# Patient Record
Sex: FEMALE | URBAN - METROPOLITAN AREA
[De-identification: names, ages, dates, MRNs, and addresses within clinical notes are randomized per-mention and may not be internally consistent; named-entity substitution may affect disease eponyms.]

---

## 2021-04-30 ENCOUNTER — OFFICE VISIT (OUTPATIENT)
Dept: URBAN - METROPOLITAN AREA CLINIC 96 | Facility: CLINIC | Age: 46
End: 2021-04-30
Payer: COMMERCIAL

## 2021-04-30 DIAGNOSIS — Z12.11 SCREEN FOR COLON CANCER: ICD-10-CM

## 2021-04-30 DIAGNOSIS — K21.9 GASTROESOPHAGEAL REFLUX DISEASE, UNSPECIFIED WHETHER ESOPHAGITIS PRESENT: ICD-10-CM

## 2021-04-30 DIAGNOSIS — C90.00 MULTIPLE MYELOMA, REMISSION STATUS UNSPECIFIED: ICD-10-CM

## 2021-04-30 DIAGNOSIS — K59.00 CONSTIPATION, UNSPECIFIED CONSTIPATION TYPE: ICD-10-CM

## 2021-04-30 DIAGNOSIS — R93.89 ABNORMAL RADIOGRAPH: ICD-10-CM

## 2021-04-30 DIAGNOSIS — K80.20 GALLSTONES: ICD-10-CM

## 2021-04-30 PROCEDURE — 99203 OFFICE O/P NEW LOW 30 MIN: CPT | Performed by: INTERNAL MEDICINE

## 2021-04-30 RX ORDER — BACLOFEN 20 MG/1
1 TABLET ADMINISTER WITHOUT REGARDS TO MEALS AS NEEDED TABLET ORAL TWICE A DAY
Status: ACTIVE | COMMUNITY

## 2021-04-30 RX ORDER — PHENTERMINE HYDROCHLORIDE 37.5 MG/1
1 TABLET TABLET ORAL ONCE A DAY
Status: ACTIVE | COMMUNITY

## 2021-04-30 RX ORDER — FENTANYL 25 UG/H
1 PATCH TO SKIN PATCH TRANSDERMAL
Status: ACTIVE | COMMUNITY

## 2021-04-30 RX ORDER — NIFEDIPINE 60 MG/1
1 TABLET ON AN EMPTY STOMACH TABLET, EXTENDED RELEASE ORAL ONCE A DAY
Status: ACTIVE | COMMUNITY

## 2021-04-30 RX ORDER — OXYCODONE HYDROCHLORIDE 10 MG/1
1 TABLET AS NEEDED TABLET ORAL
Status: ACTIVE | COMMUNITY

## 2021-04-30 RX ORDER — CARVEDILOL 25 MG/1
1 TABLET WITH FOOD TABLET, FILM COATED ORAL TWICE A DAY
Status: ACTIVE | COMMUNITY

## 2021-04-30 RX ORDER — HYDROCHLOROTHIAZIDE 25 MG/1
1 TABLET IN THE MORNING TABLET ORAL ONCE A DAY
Status: ACTIVE | COMMUNITY

## 2021-04-30 RX ORDER — LUBIPROSTONE 24 UG/1
1 CAPSULE WITH FOOD AND WATER CAPSULE, GELATIN COATED ORAL TWICE A DAY
Status: ACTIVE | COMMUNITY

## 2021-04-30 NOTE — HPI-TODAY'S VISIT:
45 yo F formerly worked for [employer]. 2 kids.  s/p lap band w dr erwin 6/2009. weighed 220# at 5' now weights 118#. last saw him 11/2020 and all was good. had stem cell tx 1/11/2021 vomited w rx and couldnt swallow. then saw dr wm braga. no rx done in that pt was better. she has returned to her baseline re eating habits has some mild gerd primarily at HS which w rxs w elevating the HOB. CT 4/26/2021-distension of esoph w ? of obs at [facility]. has chronic constipation w a bm q 3-4 days and has to freq use enemas. has just started amitiza 24mcg bid rxed by her pain MD.

## 2021-05-05 ENCOUNTER — OFFICE VISIT (OUTPATIENT)
Dept: URBAN - METROPOLITAN AREA MEDICAL CENTER 28 | Facility: MEDICAL CENTER | Age: 46
End: 2021-05-05
Payer: COMMERCIAL

## 2021-05-05 DIAGNOSIS — R93.3 ABN FINDINGS-GI TRACT: ICD-10-CM

## 2021-05-05 DIAGNOSIS — K22.8 COLUMNAR-LINED ESOPHAGUS: ICD-10-CM

## 2021-05-05 PROCEDURE — 43235 EGD DIAGNOSTIC BRUSH WASH: CPT | Performed by: INTERNAL MEDICINE

## 2021-05-05 RX ORDER — LUBIPROSTONE 24 UG/1
1 CAPSULE WITH FOOD AND WATER CAPSULE, GELATIN COATED ORAL TWICE A DAY
Status: ACTIVE | COMMUNITY

## 2021-05-05 RX ORDER — FENTANYL 25 UG/H
1 PATCH TO SKIN PATCH TRANSDERMAL
Status: ACTIVE | COMMUNITY

## 2021-05-05 RX ORDER — BACLOFEN 20 MG/1
1 TABLET ADMINISTER WITHOUT REGARDS TO MEALS AS NEEDED TABLET ORAL TWICE A DAY
Status: ACTIVE | COMMUNITY

## 2021-05-05 RX ORDER — NIFEDIPINE 60 MG/1
1 TABLET ON AN EMPTY STOMACH TABLET, EXTENDED RELEASE ORAL ONCE A DAY
Status: ACTIVE | COMMUNITY

## 2021-05-05 RX ORDER — PHENTERMINE HYDROCHLORIDE 37.5 MG/1
1 TABLET TABLET ORAL ONCE A DAY
Status: ACTIVE | COMMUNITY

## 2021-05-05 RX ORDER — OXYCODONE HYDROCHLORIDE 10 MG/1
1 TABLET AS NEEDED TABLET ORAL
Status: ACTIVE | COMMUNITY

## 2021-05-05 RX ORDER — HYDROCHLOROTHIAZIDE 25 MG/1
1 TABLET IN THE MORNING TABLET ORAL ONCE A DAY
Status: ACTIVE | COMMUNITY

## 2021-05-05 RX ORDER — CARVEDILOL 25 MG/1
1 TABLET WITH FOOD TABLET, FILM COATED ORAL TWICE A DAY
Status: ACTIVE | COMMUNITY

## 2021-11-03 ENCOUNTER — OFFICE VISIT (OUTPATIENT)
Dept: URBAN - METROPOLITAN AREA CLINIC 96 | Facility: CLINIC | Age: 46
End: 2021-11-03
Payer: COMMERCIAL

## 2021-11-03 ENCOUNTER — DASHBOARD ENCOUNTERS (OUTPATIENT)
Age: 46
End: 2021-11-03

## 2021-11-03 DIAGNOSIS — R93.89 ABNORMAL RADIOGRAPH: ICD-10-CM

## 2021-11-03 DIAGNOSIS — K21.9 GASTROESOPHAGEAL REFLUX DISEASE, UNSPECIFIED WHETHER ESOPHAGITIS PRESENT: ICD-10-CM

## 2021-11-03 DIAGNOSIS — K80.20 GALLSTONES: ICD-10-CM

## 2021-11-03 DIAGNOSIS — Z98.84 S/P BARIATRIC SURGERY: ICD-10-CM

## 2021-11-03 DIAGNOSIS — C90.00 MULTIPLE MYELOMA, REMISSION STATUS UNSPECIFIED: ICD-10-CM

## 2021-11-03 DIAGNOSIS — K59.00 CONSTIPATION, UNSPECIFIED CONSTIPATION TYPE: ICD-10-CM

## 2021-11-03 DIAGNOSIS — Z12.11 SCREEN FOR COLON CANCER: ICD-10-CM

## 2021-11-03 PROBLEM — 305058001: Status: ACTIVE | Noted: 2021-04-30

## 2021-11-03 PROBLEM — 235595009: Status: ACTIVE | Noted: 2021-04-30

## 2021-11-03 PROBLEM — 14760008: Status: ACTIVE | Noted: 2021-04-30

## 2021-11-03 PROBLEM — 168501001: Status: ACTIVE | Noted: 2021-04-30

## 2021-11-03 PROBLEM — 109989006: Status: ACTIVE | Noted: 2021-04-30

## 2021-11-03 PROBLEM — 608848006: Status: ACTIVE | Noted: 2021-11-03

## 2021-11-03 PROBLEM — 235919008: Status: ACTIVE | Noted: 2021-04-30

## 2021-11-03 PROCEDURE — 99214 OFFICE O/P EST MOD 30 MIN: CPT | Performed by: INTERNAL MEDICINE

## 2021-11-03 RX ORDER — LUBIPROSTONE 24 UG/1
1 CAPSULE WITH FOOD AND WATER CAPSULE, GELATIN COATED ORAL TWICE A DAY
Status: ACTIVE | COMMUNITY

## 2021-11-03 RX ORDER — BACLOFEN 20 MG/1
1 TABLET ADMINISTER WITHOUT REGARDS TO MEALS AS NEEDED TABLET ORAL TWICE A DAY
Status: ACTIVE | COMMUNITY

## 2021-11-03 RX ORDER — OXYCODONE HYDROCHLORIDE 10 MG/1
1 TABLET AS NEEDED TABLET ORAL
Status: ACTIVE | COMMUNITY

## 2021-11-03 RX ORDER — FENTANYL 25 UG/H
1 PATCH TO SKIN PATCH TRANSDERMAL
Status: ACTIVE | COMMUNITY

## 2021-11-03 NOTE — HPI-TODAY'S VISIT:
45 yo F formerly worked for [employer]. 2 kids.  last ov 4/30/2021 s/p lap band w dr erwin 6/2009. weighed 220# at 5' now weights 124#. last saw him 11/2020 and all was good. 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 vomited w rx and couldnt swallow. then saw dr wm braga. no rx done in that pt was better. she had returned to her baseline re eating habits has some mild gerd primarily at HS which w rxs w elevating the HOB. CT 4/26/2021-distension of esoph w ? of obs at [facility]. egd showed erosion of the band in the area of the cardia.  gerd dec when saline removed from band. has seen dr saucedo who req a f/u egd now. has chronic constipation w a bm q 3-4 days and had to freq use enemas. had just started amitiza 24mcg bid rxed by her pain MD at [facility] ov which affords her relief.

## 2021-11-09 ENCOUNTER — OFFICE VISIT (OUTPATIENT)
Dept: URBAN - METROPOLITAN AREA MEDICAL CENTER 28 | Facility: MEDICAL CENTER | Age: 46
End: 2021-11-09
Payer: COMMERCIAL

## 2021-11-09 DIAGNOSIS — T18.2XXA FOREIGN BODY IN STOMACH: ICD-10-CM

## 2021-11-09 DIAGNOSIS — Z98.84 BARIATRIC SURG STAT-UNSP: ICD-10-CM

## 2021-11-09 PROCEDURE — 43235 EGD DIAGNOSTIC BRUSH WASH: CPT | Performed by: INTERNAL MEDICINE

## 2021-11-09 RX ORDER — OXYCODONE HYDROCHLORIDE 10 MG/1
1 TABLET AS NEEDED TABLET ORAL
Status: ACTIVE | COMMUNITY

## 2021-11-09 RX ORDER — BACLOFEN 20 MG/1
1 TABLET ADMINISTER WITHOUT REGARDS TO MEALS AS NEEDED TABLET ORAL TWICE A DAY
Status: ACTIVE | COMMUNITY

## 2021-11-09 RX ORDER — LUBIPROSTONE 24 UG/1
1 CAPSULE WITH FOOD AND WATER CAPSULE, GELATIN COATED ORAL TWICE A DAY
Status: ACTIVE | COMMUNITY

## 2021-11-09 RX ORDER — FENTANYL 25 UG/H
1 PATCH TO SKIN PATCH TRANSDERMAL
Status: ACTIVE | COMMUNITY